# Patient Record
Sex: FEMALE | Race: WHITE | NOT HISPANIC OR LATINO
[De-identification: names, ages, dates, MRNs, and addresses within clinical notes are randomized per-mention and may not be internally consistent; named-entity substitution may affect disease eponyms.]

---

## 2017-02-01 ENCOUNTER — RX RENEWAL (OUTPATIENT)
Age: 42
End: 2017-02-01

## 2017-07-02 ENCOUNTER — RX RENEWAL (OUTPATIENT)
Age: 42
End: 2017-07-02

## 2017-08-27 ENCOUNTER — RX RENEWAL (OUTPATIENT)
Age: 42
End: 2017-08-27

## 2017-09-06 ENCOUNTER — APPOINTMENT (OUTPATIENT)
Dept: OBGYN | Facility: CLINIC | Age: 42
End: 2017-09-06
Payer: COMMERCIAL

## 2017-09-06 VITALS
DIASTOLIC BLOOD PRESSURE: 70 MMHG | WEIGHT: 134 LBS | SYSTOLIC BLOOD PRESSURE: 128 MMHG | HEIGHT: 63 IN | BODY MASS INDEX: 23.74 KG/M2

## 2017-09-06 DIAGNOSIS — Z01.419 ENCOUNTER FOR GYNECOLOGICAL EXAMINATION (GENERAL) (ROUTINE) W/OUT ABNORMAL FINDINGS: ICD-10-CM

## 2017-09-06 PROCEDURE — 99396 PREV VISIT EST AGE 40-64: CPT

## 2017-09-06 RX ORDER — ELUXADOLINE 75 MG/1
75 TABLET, FILM COATED ORAL
Refills: 0 | Status: ACTIVE | COMMUNITY
Start: 2017-09-06

## 2017-09-06 RX ORDER — HYOSCYAMINE SULFATE 0.12 MG/1
0.12 TABLET ORAL
Qty: 60 | Refills: 0 | Status: COMPLETED | COMMUNITY
Start: 2017-07-03

## 2017-09-06 RX ORDER — CLINDAMYCIN HYDROCHLORIDE 300 MG/1
300 CAPSULE ORAL
Qty: 28 | Refills: 0 | Status: COMPLETED | COMMUNITY
Start: 2017-03-27

## 2017-09-07 LAB — HPV HIGH+LOW RISK DNA PNL CVX: NEGATIVE

## 2017-09-10 LAB — CYTOLOGY CVX/VAG DOC THIN PREP: NORMAL

## 2017-09-28 ENCOUNTER — APPOINTMENT (OUTPATIENT)
Dept: OBGYN | Facility: CLINIC | Age: 42
End: 2017-09-28

## 2018-08-24 ENCOUNTER — RX RENEWAL (OUTPATIENT)
Age: 43
End: 2018-08-24

## 2018-10-01 ENCOUNTER — APPOINTMENT (OUTPATIENT)
Dept: OBGYN | Facility: CLINIC | Age: 43
End: 2018-10-01
Payer: COMMERCIAL

## 2018-10-01 VITALS
WEIGHT: 137 LBS | HEIGHT: 63 IN | SYSTOLIC BLOOD PRESSURE: 124 MMHG | DIASTOLIC BLOOD PRESSURE: 87 MMHG | BODY MASS INDEX: 24.27 KG/M2

## 2018-10-01 DIAGNOSIS — Z87.19 PERSONAL HISTORY OF OTHER DISEASES OF THE DIGESTIVE SYSTEM: ICD-10-CM

## 2018-10-01 DIAGNOSIS — Z78.9 OTHER SPECIFIED HEALTH STATUS: ICD-10-CM

## 2018-10-01 PROCEDURE — 99396 PREV VISIT EST AGE 40-64: CPT

## 2018-10-05 LAB — CYTOLOGY CVX/VAG DOC THIN PREP: NORMAL

## 2019-10-14 ENCOUNTER — APPOINTMENT (OUTPATIENT)
Dept: OBGYN | Facility: CLINIC | Age: 44
End: 2019-10-14
Payer: COMMERCIAL

## 2019-10-14 VITALS
DIASTOLIC BLOOD PRESSURE: 85 MMHG | WEIGHT: 137 LBS | BODY MASS INDEX: 24.27 KG/M2 | SYSTOLIC BLOOD PRESSURE: 122 MMHG | HEIGHT: 63 IN

## 2019-10-14 DIAGNOSIS — Z30.41 ENCOUNTER FOR SURVEILLANCE OF CONTRACEPTIVE PILLS: ICD-10-CM

## 2019-10-14 DIAGNOSIS — Z01.419 ENCOUNTER FOR GYNECOLOGICAL EXAMINATION (GENERAL) (ROUTINE) W/OUT ABNORMAL FINDINGS: ICD-10-CM

## 2019-10-14 PROCEDURE — 99396 PREV VISIT EST AGE 40-64: CPT

## 2019-10-14 NOTE — PHYSICAL EXAM
[Awake] : awake [Alert] : alert [Labia Majora] : labia major [Labia Minora] : labia minora [Normal] : clitoris [FreeTextEntry5] : non tender PAP done [FreeTextEntry4] : pink good tone [External Hemorrhoid] : no external hemorrhoids [FreeTextEntry6] : nl bimanual no pelvic massses [FreeTextEntry7] : non tender no uterine or adnexal masses

## 2019-10-21 LAB — CYTOLOGY CVX/VAG DOC THIN PREP: NORMAL

## 2020-02-13 RX ORDER — NORETHINDRONE ACETATE AND ETHINYL ESTRADIOL AND FERROUS FUMARATE 1MG-20(21)
1-20 KIT ORAL
Qty: 3 | Refills: 4 | Status: DISCONTINUED | COMMUNITY
Start: 2018-10-01 | End: 2020-02-13

## 2020-03-23 DIAGNOSIS — N64.89 OTHER SPECIFIED DISORDERS OF BREAST: ICD-10-CM

## 2020-08-07 ENCOUNTER — RX RENEWAL (OUTPATIENT)
Age: 45
End: 2020-08-07

## 2020-10-28 ENCOUNTER — RX RENEWAL (OUTPATIENT)
Age: 45
End: 2020-10-28

## 2020-10-29 ENCOUNTER — RX RENEWAL (OUTPATIENT)
Age: 45
End: 2020-10-29

## 2021-07-28 ENCOUNTER — APPOINTMENT (OUTPATIENT)
Dept: OBGYN | Facility: CLINIC | Age: 46
End: 2021-07-28
Payer: COMMERCIAL

## 2021-07-28 VITALS
WEIGHT: 134 LBS | SYSTOLIC BLOOD PRESSURE: 145 MMHG | HEIGHT: 63 IN | BODY MASS INDEX: 23.74 KG/M2 | DIASTOLIC BLOOD PRESSURE: 83 MMHG

## 2021-07-28 DIAGNOSIS — Z01.419 ENCOUNTER FOR GYNECOLOGICAL EXAMINATION (GENERAL) (ROUTINE) W/OUT ABNORMAL FINDINGS: ICD-10-CM

## 2021-07-28 DIAGNOSIS — N94.10 UNSPECIFIED DYSPAREUNIA: ICD-10-CM

## 2021-07-28 PROCEDURE — 99396 PREV VISIT EST AGE 40-64: CPT

## 2021-07-28 PROCEDURE — 99212 OFFICE O/P EST SF 10 MIN: CPT | Mod: 25

## 2021-07-28 NOTE — DISCUSSION/SUMMARY
[FreeTextEntry1] : 46 yr old on [place]ia and not bleeding on and wants to stay on / Told her at 50 will take her off and may use bioiidentical hormones. She has very painful sex and used every kind of lubricant and still hurts sent Estrace to pharmacy and patient to get TVS as also feels sex pain in abdomen. Sent [place]ia to pharmacy. She is a teacher and off summer.

## 2021-07-28 NOTE — PHYSICAL EXAM
[Appropriately responsive] : appropriately responsive [Alert] : alert [No Acute Distress] : no acute distress [No Lymphadenopathy] : no lymphadenopathy [Regular Rate Rhythm] : regular rate rhythm [No Murmurs] : no murmurs [Clear to Auscultation B/L] : clear to auscultation bilaterally [Soft] : soft [Non-tender] : non-tender [Oriented x3] : oriented x3 [FreeTextEntry3] : ni=o tyromegaly [FreeTextEntry4] : S1 S2 [FreeTextEntry7] : no organomegaly [Examination Of The Breasts] : a normal appearance [No Masses] : no breast masses were palpable [Labia Majora] : normal [Labia Minora] : normal [Normal] : normal [Atrophy] : atrophy [FreeTextEntry5] : no tender PAP done [FreeTextEntry6] : no uterineor adnexal masses but tender

## 2021-07-30 ENCOUNTER — NON-APPOINTMENT (OUTPATIENT)
Age: 46
End: 2021-07-30

## 2021-07-30 DIAGNOSIS — N76.0 ACUTE VAGINITIS: ICD-10-CM

## 2021-07-30 DIAGNOSIS — B96.89 ACUTE VAGINITIS: ICD-10-CM

## 2021-07-30 LAB
CANDIDA VAG CYTO: NOT DETECTED
G VAGINALIS+PREV SP MTYP VAG QL MICRO: DETECTED
T VAGINALIS VAG QL WET PREP: NOT DETECTED

## 2021-08-04 LAB — CYTOLOGY CVX/VAG DOC THIN PREP: NORMAL

## 2022-06-09 ENCOUNTER — RX RENEWAL (OUTPATIENT)
Age: 47
End: 2022-06-09

## 2022-09-01 ENCOUNTER — RX RENEWAL (OUTPATIENT)
Age: 47
End: 2022-09-01

## 2022-09-14 ENCOUNTER — APPOINTMENT (OUTPATIENT)
Dept: ORTHOPEDIC SURGERY | Facility: CLINIC | Age: 47
End: 2022-09-14

## 2022-09-14 VITALS — BODY MASS INDEX: 23.74 KG/M2 | WEIGHT: 134 LBS | HEIGHT: 63 IN

## 2022-09-14 PROCEDURE — 20526 THER INJECTION CARP TUNNEL: CPT | Mod: 50

## 2022-09-14 PROCEDURE — 99214 OFFICE O/P EST MOD 30 MIN: CPT | Mod: 25

## 2022-09-14 NOTE — IMAGING
[de-identified] : right and left hand no swelling or deformity\par No thenar atrophy\par full actve range of motion\par decreased sensation to the median nerve\par intact ulnar and radial sensation\par ain/pin/ulnar motor intact\par +tinels\par palpable pulses with CR<2s\par full motion to the elbow, shoulder\par

## 2022-09-14 NOTE — HISTORY OF PRESENT ILLNESS
[Neck] : neck [Radiating] : radiating [Tingling] : tingling [Constant] : constant [Work] : work [Sleep] : sleep [de-identified] : 9/14/22:  Pt has had n/t in fingers for 2 weeks.  The n/t wakes her from sleep.  She reports that she gets stiffness in her fingers.  She does not have n/t during the day but it is getting progressively worse. [] : no [FreeTextEntry1] : b/l hands/left shoulder/right elbow  [FreeTextEntry3] : 9/4/22 [FreeTextEntry5] : patient is feeling increased pain, numbness and stiffness in the hands. Pain radiates into arm and shoulder. Pain is worse at night  [FreeTextEntry6] : numbness  [FreeTextEntry7] : right elbow to the shoulder

## 2022-09-14 NOTE — ASSESSMENT
[FreeTextEntry1] : The patient was advised of the diagnosis.  The natural history of the pathology was explained in full to the patient in layman's terms. We discussed the nature of the nerve as an electrical cable and what happens to the nerve in carpal tunnel syndrome.  We discussed that treatment for night symptoms included night bracing.  We discussed the possibility of injection when symptoms were intermittent or in patients who were unwilling to undergo surgery with constant symptoms.  We discussed that injection is a diagnostic and therapeutic aide and what this means.  We discussed the use of nerve testing in cases when diagnosis was in doubt or for confirmation to exclude alternate pathology.  We discussed that if symptoms were 24/7 surgery was recommended to give the nerve the best chance to recover but that once symptoms were constant, the nerve may not recover even with surgery.  We discussed that if left alone the nerve progression could worsen and that treatment was indicated to prevent progression of nerve compression.  The longer the nerve is left, the more likely to cause worsening irreversible damage.  All questions were answered.  The risks and benefits of surgical and non-surgical treatment alternatives were explained in full to the patient.\par The risks, benefits and contents of the injection have been discussed.  Risks include but are not limited to allergic reaction, flare reaction, permanent white skin discoloration at the injection site and infection.  The patient understands the risks and agrees to having the injection.  We also discussed that about 22% of patients have relief at a year but the rest have recurrence if the symptoms.  However, if the injection is successful it is a positive predictor of benefit of surgery.  Success of the injection to relieve symptoms is also a great diagnostic test and obviates the need for EMG testing.  The patient verbalized understanding.  All questions have been answered. A sterile prep of the left and right volar wrist was performed and the carpal tunnel was entered and injected with 1 cc of Lidocaine and 6mg of celestone. The patient tolerated the procedure well without complication. The patient was instructed to ice the area of the injection\par \par Recommended that the pt wear night braces.\par

## 2022-10-25 ENCOUNTER — APPOINTMENT (OUTPATIENT)
Dept: ORTHOPEDIC SURGERY | Facility: CLINIC | Age: 47
End: 2022-10-25

## 2022-11-24 ENCOUNTER — RX RENEWAL (OUTPATIENT)
Age: 47
End: 2022-11-24

## 2023-01-13 ENCOUNTER — APPOINTMENT (OUTPATIENT)
Dept: ORTHOPEDIC SURGERY | Facility: CLINIC | Age: 48
End: 2023-01-13
Payer: COMMERCIAL

## 2023-01-13 VITALS — BODY MASS INDEX: 23.74 KG/M2 | WEIGHT: 134 LBS | HEIGHT: 63 IN

## 2023-01-13 DIAGNOSIS — G56.21 LESION OF ULNAR NERVE, RIGHT UPPER LIMB: ICD-10-CM

## 2023-01-13 DIAGNOSIS — G56.22 LESION OF ULNAR NERVE, LEFT UPPER LIMB: ICD-10-CM

## 2023-01-13 PROCEDURE — 20526 THER INJECTION CARP TUNNEL: CPT | Mod: 50

## 2023-01-13 PROCEDURE — 99214 OFFICE O/P EST MOD 30 MIN: CPT | Mod: 25

## 2023-01-13 NOTE — IMAGING
[de-identified] : right and left hand no swelling or deformity\par No thenar atrophy noted to the right / left hand\par full active range of motion symmetrically\par decreased sensation to the median nerve and ulnar nerve symmetrically\par intact radial sensation symmetrically\par ain/pin/ulnar motor intact\par +tinels over the bilateral carpal tunnels and cubital tunnels\par palpable pulses with CR<2s\par full motion to the elbow, shoulder\par Spurling Signs are negative\par

## 2023-01-13 NOTE — ASSESSMENT
[FreeTextEntry1] : The patient was advised of the diagnosis.  The natural history of the pathology was explained in full to the patient in layman's terms. We discussed the nature of the nerve as an electrical cable and what happens to the nerve in carpal tunnel syndrome.  We discussed that treatment for night symptoms included night bracing.  We discussed the possibility of injection when symptoms were intermittent or in patients who were unwilling to undergo surgery with constant symptoms.  We discussed that injection is a diagnostic and therapeutic aide and what this means.  We discussed the use of nerve testing in cases when diagnosis was in doubt or for confirmation to exclude alternate pathology.  We discussed that if symptoms were 24/7 surgery was recommended to give the nerve the best chance to recover but that once symptoms were constant, the nerve may not recover even with surgery.  We discussed that if left alone the nerve progression could worsen and that treatment was indicated to prevent progression of nerve compression.  The longer the nerve is left, the more likely to cause worsening irreversible damage.  All questions were answered.  The risks and benefits of surgical and non-surgical treatment alternatives were explained in full to the patient.\par The risks, benefits and contents of the injection have been discussed.  Risks include but are not limited to allergic reaction, flare reaction, permanent white skin discoloration at the injection site and infection.  The patient understands the risks and agrees to having the injection.  We also discussed that about 22% of patients have relief at a year but the rest have recurrence if the symptoms.  However, if the injection is successful it is a positive predictor of benefit of surgery.  Success of the injection to relieve symptoms is also a great diagnostic test and obviates the need for EMG testing.  The patient verbalized understanding.  All questions have been answered. A sterile prep of the left and right volar wrist was performed and the carpal tunnel was entered and injected with 1 cc of Lidocaine and 6mg of celestone. The patient tolerated the procedure well without complication. The patient was instructed to ice the area of the injection\par Bilateral carpal tunnel CSI #2 performed today. \par \par Recommended that the pt wear night braces for Cubital Tunnel Syndrome\par Pt referred for EMG testing and she will f/u with Dr. Menezes next week. \par

## 2023-01-13 NOTE — HISTORY OF PRESENT ILLNESS
[8] : 8 [Tingling] : tingling [Full time] : Work status: full time [Steroid] : Steroid [Neck] : neck [Radiating] : radiating [Constant] : constant [Work] : work [Sleep] : sleep [de-identified] : \par 1/13/2023: pt here with recurrent bilateral severe carpal tunnel syndrome. Pt states this flaired up on her again 2 weeks ago ( pt has had one previous CSI to the carpal tunnels in September with significant pain relief. \par Pt denies any associated weakness or cervical symptoms. \par Pt states her symptoms have been 24/7 over the past week and worse at night, not allowing her to sleep.\par Night splinting wrists has not helped "at all".\par \par Dr. Masters note below:\par 9/14/22:  Pt has had n/t in fingers for 2 weeks.  The n/t wakes her from sleep.  She reports that she gets stiffness in her fingers.  She does not have n/t during the day but it is getting progressively worse. [] : Post Surgical Visit: no [FreeTextEntry1] : b/l hands/left shoulder/right elbow  [FreeTextEntry3] : 9/4/22 [FreeTextEntry5] : patient is feeling increased pain, numbness and stiffness in the hands. Pain radiates into arm and shoulder. Pain is worse at night  [FreeTextEntry6] : numbness  [FreeTextEntry7] : right elbow to the shoulder

## 2023-01-19 ENCOUNTER — APPOINTMENT (OUTPATIENT)
Dept: NEUROLOGY | Facility: CLINIC | Age: 48
End: 2023-01-19
Payer: COMMERCIAL

## 2023-01-19 DIAGNOSIS — M79.2 NEURALGIA AND NEURITIS, UNSPECIFIED: ICD-10-CM

## 2023-01-19 PROCEDURE — 95886 MUSC TEST DONE W/N TEST COMP: CPT

## 2023-01-19 PROCEDURE — 95912 NRV CNDJ TEST 11-12 STUDIES: CPT

## 2023-01-23 ENCOUNTER — APPOINTMENT (OUTPATIENT)
Dept: ORTHOPEDIC SURGERY | Facility: CLINIC | Age: 48
End: 2023-01-23
Payer: COMMERCIAL

## 2023-01-23 VITALS — BODY MASS INDEX: 23.74 KG/M2 | WEIGHT: 134 LBS | HEIGHT: 63 IN

## 2023-01-23 PROCEDURE — 99213 OFFICE O/P EST LOW 20 MIN: CPT

## 2023-01-24 NOTE — HISTORY OF PRESENT ILLNESS
[Gradual] : gradual [10] : 10 [8] : 8 [Dull/Aching] : dull/aching [Sharp] : sharp [Tingling] : tingling [Sleep] : sleep [Nothing helps with pain getting better] : Nothing helps with pain getting better [de-identified] : She has bilateral hand numbness nad tingling that gets severe\par Injections for CTS help\par \par EMG shows mild bilateral CTS [] : no [FreeTextEntry1] : wrists [FreeTextEntry5] : patient states she has pain, swelling and numbness [FreeTextEntry6] : numbness [FreeTextEntry7] : up and down the arm  [de-identified] : activity  [de-identified] : 01/13/23 [de-identified] : [facility]  [de-identified] : EMG

## 2023-01-24 NOTE — ASSESSMENT
[FreeTextEntry1] : She just received injection\par I discussed possible another injection vs CTR with her\par WIll return prn

## 2023-01-24 NOTE — PHYSICAL EXAM
[de-identified] : R hand: \par Tender volar wrist \par Good finger ROM \par +Tinels \par +Phalens \par +Compression test \par Decreased sensation median nerve distribution\par \par \par L hand: \par Tender volar wrist \par Good finger ROM \par +Tinels \par +Phalens \par +Compression test \par Decreased sensation median nerve distribution\par

## 2023-05-04 ENCOUNTER — APPOINTMENT (OUTPATIENT)
Dept: ORTHOPEDIC SURGERY | Facility: CLINIC | Age: 48
End: 2023-05-04
Payer: COMMERCIAL

## 2023-05-04 VITALS — WEIGHT: 134 LBS | HEIGHT: 63 IN | BODY MASS INDEX: 23.74 KG/M2

## 2023-05-04 DIAGNOSIS — G56.02 CARPAL TUNNEL SYNDROME, LEFT UPPER LIMB: ICD-10-CM

## 2023-05-04 PROCEDURE — 20526 THER INJECTION CARP TUNNEL: CPT | Mod: 50

## 2023-05-04 PROCEDURE — J3490M: CUSTOM

## 2023-05-04 PROCEDURE — 99214 OFFICE O/P EST MOD 30 MIN: CPT | Mod: 25

## 2023-05-04 NOTE — PHYSICAL EXAM
[de-identified] : R hand: \par Tender volar wrist \par Good finger ROM \par +Tinels \par +Phalens \par +Compression test \par Decreased sensation median nerve distribution\par +thenar atrophy\par \par L hand: \par Tender volar wrist \par Good finger ROM \par +Tinels \par +Phalens \par +Compression test \par Decreased sensation median nerve distribution\par +thenar atrophy\par \par

## 2023-05-04 NOTE — ASSESSMENT
[FreeTextEntry1] : For R CTR (after 6/4)\par R/B/A of surgery discussed with the patient. Risks including but not limited to infection, nerve damage, tendon damage, pain, stiffness, recurrence, no resolution of symptoms, loss of function, limb or life. They understand and agree to surgery \par Return post op

## 2023-05-04 NOTE — HISTORY OF PRESENT ILLNESS
[Tingling] : tingling [10] : 10 [7] : 7 [Dull/Aching] : dull/aching [de-identified] : Bilateral CTS\par  [FreeTextEntry1] : writs  [FreeTextEntry5] : pain is worse\par  [FreeTextEntry6] : numbness [de-identified] : inj last visit

## 2023-05-04 NOTE — DISCUSSION/SUMMARY
[de-identified] : Bilateral Carpal tunnel injection was performed because of pain inflammation\par Anesthesia: ethyl chloride sprayed topically\par Celestone: An injection of Celestone 1cc\par Lidocaine: An injection of Lidocaine 1% 1cc\par Marcaine: An injection of Marcaine 0.5% 1cc\par x2\par \par Patient has tried OTC's including aspirin, Ibuprofen, Aleve etc or prescription NSAIDS, and/or exercises at home and/ or\par physical therapy without satisfactory response.\par After verbal consent using sterile preparation and technique. The risks, benefits, and alternatives to cortisone injection\par were explained in full to the patient. Risks outlined include but are not limited to infection, sepsis, bleeding, scarring, skin\par discoloration, temporary increase in pain, syncopal episode, failure to resolve symptoms, allergic reaction, symptom\par recurrence, and elevation of blood sugar in diabetics. Patient understood the risks. All questions were answered. After\par discussion of options, patient requested an injection. Oral informed consent was obtained and sterile prep was done of the\par injection site. Sterile technique was utilized for the procedure including the preparation of the solutions used for the\par injection. Patient tolerated the procedure well. Advised to ice the injection site this evening.\par Prep with betadine locally to site. Sterile technique used

## 2023-07-26 ENCOUNTER — APPOINTMENT (OUTPATIENT)
Age: 48
End: 2023-07-26
Payer: COMMERCIAL

## 2023-07-26 PROCEDURE — 64721 CARPAL TUNNEL SURGERY: CPT | Mod: AS,RT

## 2023-07-26 PROCEDURE — 64721 CARPAL TUNNEL SURGERY: CPT | Mod: RT

## 2023-08-03 ENCOUNTER — APPOINTMENT (OUTPATIENT)
Dept: ORTHOPEDIC SURGERY | Facility: CLINIC | Age: 48
End: 2023-08-03
Payer: COMMERCIAL

## 2023-08-03 VITALS — BODY MASS INDEX: 23.74 KG/M2 | WEIGHT: 134 LBS | HEIGHT: 63 IN

## 2023-08-03 PROCEDURE — 99024 POSTOP FOLLOW-UP VISIT: CPT

## 2023-08-03 NOTE — PHYSICAL EXAM
[de-identified] : Mild hand swelling Healed incision No evidence of infection Mild tenderness at the surgical site Good finger ROM Improved sensation

## 2023-08-03 NOTE — HISTORY OF PRESENT ILLNESS
[0] : 0 [Tingling] : tingling [] : Post Surgical Visit: yes [de-identified] : R CTR last wek SHe is improved  [FreeTextEntry1] : R hand [FreeTextEntry5] : here for post op  [FreeTextEntry6] : numbness [de-identified] : 7/26/23 [de-identified] : R ALEXANDRE

## 2023-08-03 NOTE — ASSESSMENT
[FreeTextEntry1] : Sutures removed Steris applied LIght activities and advance as tolerated Retirn in 2-3 weeks Hold surgery next week

## 2023-08-09 ENCOUNTER — APPOINTMENT (OUTPATIENT)
Dept: OBGYN | Facility: CLINIC | Age: 48
End: 2023-08-09
Payer: COMMERCIAL

## 2023-08-09 ENCOUNTER — APPOINTMENT (OUTPATIENT)
Age: 48
End: 2023-08-09

## 2023-08-09 VITALS
BODY MASS INDEX: 23.04 KG/M2 | SYSTOLIC BLOOD PRESSURE: 135 MMHG | WEIGHT: 130 LBS | HEIGHT: 63 IN | DIASTOLIC BLOOD PRESSURE: 78 MMHG

## 2023-08-09 PROCEDURE — 99396 PREV VISIT EST AGE 40-64: CPT

## 2023-08-09 PROCEDURE — 87210 SMEAR WET MOUNT SALINE/INK: CPT | Mod: QW

## 2023-08-09 NOTE — HISTORY OF PRESENT ILLNESS
[TextBox_4] : Annual exam for this 48 year old female, , LMP 6 years ago c/o vaginal dryness and painful intercourse x several years.

## 2023-08-10 LAB — HPV HIGH+LOW RISK DNA PNL CVX: NOT DETECTED

## 2023-08-14 LAB — CYTOLOGY CVX/VAG DOC THIN PREP: NORMAL

## 2023-08-15 ENCOUNTER — APPOINTMENT (OUTPATIENT)
Dept: OBGYN | Facility: CLINIC | Age: 48
End: 2023-08-15

## 2023-08-17 ENCOUNTER — APPOINTMENT (OUTPATIENT)
Dept: ORTHOPEDIC SURGERY | Facility: CLINIC | Age: 48
End: 2023-08-17
Payer: COMMERCIAL

## 2023-08-17 VITALS — BODY MASS INDEX: 23.04 KG/M2 | WEIGHT: 130 LBS | HEIGHT: 63 IN

## 2023-08-17 DIAGNOSIS — G56.01 CARPAL TUNNEL SYNDROME, RIGHT UPPER LIMB: ICD-10-CM

## 2023-08-17 PROCEDURE — 99024 POSTOP FOLLOW-UP VISIT: CPT

## 2023-08-17 NOTE — HISTORY OF PRESENT ILLNESS
[Tingling] : tingling [] : Post Surgical Visit: yes [7] : 7 [Dull/Aching] : dull/aching [de-identified] : R CTR She still has pain at the surgical site [FreeTextEntry1] : R hand [FreeTextEntry6] : numbness [de-identified] : no [de-identified] : 07/26/23 [de-identified] : R ALEXANDRE

## 2023-08-17 NOTE — REVIEW OF SYSTEMS
[Joint Stiffness] : joint stiffness [Negative] : Heme/Lymph [Joint Pain] : joint pain [Joint Swelling] : joint swelling

## 2024-06-06 ENCOUNTER — APPOINTMENT (OUTPATIENT)
Dept: INTERNAL MEDICINE | Facility: CLINIC | Age: 49
End: 2024-06-06
Payer: COMMERCIAL

## 2024-06-06 ENCOUNTER — NON-APPOINTMENT (OUTPATIENT)
Age: 49
End: 2024-06-06

## 2024-06-06 VITALS
DIASTOLIC BLOOD PRESSURE: 75 MMHG | RESPIRATION RATE: 16 BRPM | BODY MASS INDEX: 24.27 KG/M2 | WEIGHT: 137 LBS | HEIGHT: 63 IN | SYSTOLIC BLOOD PRESSURE: 126 MMHG | HEART RATE: 72 BPM | OXYGEN SATURATION: 97 %

## 2024-06-06 DIAGNOSIS — K58.9 IRRITABLE BOWEL SYNDROME W/OUT DIARRHEA: ICD-10-CM

## 2024-06-06 DIAGNOSIS — Z13.31 ENCOUNTER FOR SCREENING FOR DEPRESSION: ICD-10-CM

## 2024-06-06 DIAGNOSIS — F41.1 GENERALIZED ANXIETY DISORDER: ICD-10-CM

## 2024-06-06 DIAGNOSIS — Z00.00 ENCOUNTER FOR GENERAL ADULT MEDICAL EXAMINATION W/OUT ABNORMAL FINDINGS: ICD-10-CM

## 2024-06-06 DIAGNOSIS — Z87.42 PERSONAL HISTORY OF OTHER DISEASES OF THE FEMALE GENITAL TRACT: ICD-10-CM

## 2024-06-06 DIAGNOSIS — F90.9 ATTENTION-DEFICIT HYPERACTIVITY DISORDER, UNSPECIFIED TYPE: ICD-10-CM

## 2024-06-06 PROCEDURE — G0444 DEPRESSION SCREEN ANNUAL: CPT | Mod: 59

## 2024-06-06 PROCEDURE — 99203 OFFICE O/P NEW LOW 30 MIN: CPT

## 2024-06-06 PROCEDURE — 93000 ELECTROCARDIOGRAM COMPLETE: CPT

## 2024-06-06 PROCEDURE — 99386 PREV VISIT NEW AGE 40-64: CPT | Mod: 25

## 2024-06-06 RX ORDER — ALPRAZOLAM 1 MG/1
1 TABLET ORAL
Qty: 20 | Refills: 0 | Status: ACTIVE | COMMUNITY
Start: 1900-01-01 | End: 1900-01-01

## 2024-06-06 RX ORDER — DROSPIRENONE AND ETHINYL ESTRADIOL 0.02-3(28)
3-0.02 KIT ORAL DAILY
Qty: 84 | Refills: 3 | Status: DISCONTINUED | COMMUNITY
Start: 2021-07-28 | End: 2024-06-06

## 2024-06-06 RX ORDER — NORETHINDRONE ACETATE AND ETHINYL ESTRADIOL 1MG-20(21)
1-20 KIT ORAL
Qty: 1 | Refills: 0 | Status: DISCONTINUED | COMMUNITY
Start: 2017-02-01 | End: 2024-06-06

## 2024-06-06 RX ORDER — LISDEXAMFETAMINE 70 MG/1
70 CAPSULE ORAL DAILY
Qty: 30 | Refills: 0 | Status: ACTIVE | COMMUNITY
Start: 1900-01-01 | End: 1900-01-01

## 2024-06-06 RX ORDER — METRONIDAZOLE 7.5 MG/G
0.75 GEL VAGINAL
Qty: 1 | Refills: 3 | Status: DISCONTINUED | COMMUNITY
Start: 2021-07-30 | End: 2024-06-06

## 2024-06-06 RX ORDER — ESTRADIOL 0.1 MG/G
0.1 CREAM VAGINAL
Qty: 1 | Refills: 6 | Status: DISCONTINUED | COMMUNITY
Start: 2021-07-28 | End: 2024-06-06

## 2024-06-06 RX ORDER — NORETHINDRONE ACETATE AND ETHINYL ESTRADIOL 1MG-20(21)
1-20 KIT ORAL DAILY
Qty: 90 | Refills: 1 | Status: DISCONTINUED | COMMUNITY
Start: 2019-10-14 | End: 2024-06-06

## 2024-06-06 RX ORDER — HYDROCODONE BITARTRATE AND ACETAMINOPHEN 5; 325 MG/1; MG/1
5-325 TABLET ORAL
Qty: 10 | Refills: 0 | Status: DISCONTINUED | COMMUNITY
Start: 2023-07-24 | End: 2024-06-06

## 2024-06-06 RX ORDER — NORETHINDRONE ACETATE AND ETHINYL ESTRADIOL AND FERROUS FUMARATE 1MG-20(21)
1-20 KIT ORAL
Qty: 1 | Refills: 0 | Status: DISCONTINUED | COMMUNITY
Start: 2020-02-13 | End: 2024-06-06

## 2024-06-06 NOTE — HEALTH RISK ASSESSMENT
[Never] : Never [No] : In the past 12 months have you used drugs other than those required for medical reasons? No [0] : 2) Feeling down, depressed, or hopeless: Not at all (0) [PHQ-2 Negative - No further assessment needed] : PHQ-2 Negative - No further assessment needed [Time Spent: ___ Minutes] : I spent [unfilled] minutes performing a depression screening for this patient. [Patient reported colonoscopy was normal] : Patient reported colonoscopy was normal [Feels Safe at Home] : Feels safe at home [[id number]] : 0 [Reports changes in hearing] : Reports no changes in hearing [Reports changes in vision] : Reports no changes in vision [Reports changes in dental health] : Reports no changes in dental health [MammogramDate] : 08/20 [ColonoscopyDate] : 01/22

## 2024-06-06 NOTE — PLAN
[FreeTextEntry1] : Reviewed age appropriate preventive screening with patient today and importance of regular screening as indicated. Encouraged exercise of at least 30 mins daily of moderate activity as tolerated.  If unable to participate in moderate activity, encouraged walking daily for 20 to 30mins. Discussed healthy dietary intake of vegetables, whole grains, lean proteins with avoidance of high sugar and sodium intake. Completed labs in office today, will await results and notify patient accordingly  -Aware need to f/u regularly in office for refills of controlled substances -Advised to use xanax as PRN basis, if requires standing or on daily basis will need to see psych -F/U in 3 mos for medications

## 2024-06-06 NOTE — HISTORY OF PRESENT ILLNESS
[FreeTextEntry1] : NP CPE Medication refill [de-identified] : Ms. YOLI BETANCOURT is a 49 year old female hx of IBS, ADHD, anxiety here for CPE Here PCP now doing  medicine, here to establish care for prescription management Previously on SSRIs - states worsened anxiety-now on xanax 1mg - usually takes 1/2 tablet and not requiring on daily basis Hx of ADHD, started on vyvanse by her PCP

## 2024-06-06 NOTE — CURRENT MEDS
[Takes medication as prescribed] : takes [None] : Patient does not have any barriers to medication adherence [Benzodiazepines] : benzodiazepines [Other____] : [unfilled] [FreeTextEntry1] : Aware of risks of medications, including dependence, tolerance

## 2024-06-11 LAB
ALBUMIN SERPL ELPH-MCNC: 4.3 G/DL
ALP BLD-CCNC: 95 U/L
ALT SERPL-CCNC: 18 U/L
ANION GAP SERPL CALC-SCNC: 12 MMOL/L
AST SERPL-CCNC: 20 U/L
BASOPHILS # BLD AUTO: 0.02 K/UL
BASOPHILS NFR BLD AUTO: 0.4 %
BILIRUB SERPL-MCNC: 0.6 MG/DL
BUN SERPL-MCNC: 12 MG/DL
CALCIUM SERPL-MCNC: 9.1 MG/DL
CHLORIDE SERPL-SCNC: 109 MMOL/L
CHOLEST SERPL-MCNC: 168 MG/DL
CO2 SERPL-SCNC: 24 MMOL/L
CREAT SERPL-MCNC: 0.69 MG/DL
EGFR: 106 ML/MIN/1.73M2
EOSINOPHIL # BLD AUTO: 0.11 K/UL
EOSINOPHIL NFR BLD AUTO: 2.3 %
ESTIMATED AVERAGE GLUCOSE: 105 MG/DL
GLUCOSE SERPL-MCNC: 96 MG/DL
HBA1C MFR BLD HPLC: 5.3 %
HCT VFR BLD CALC: 39.1 %
HDLC SERPL-MCNC: 84 MG/DL
HGB BLD-MCNC: 12.3 G/DL
IMM GRANULOCYTES NFR BLD AUTO: 0 %
LDLC SERPL CALC-MCNC: 75 MG/DL
LYMPHOCYTES # BLD AUTO: 1.37 K/UL
LYMPHOCYTES NFR BLD AUTO: 29 %
MAN DIFF?: NORMAL
MCHC RBC-ENTMCNC: 29.3 PG
MCHC RBC-ENTMCNC: 31.5 GM/DL
MCV RBC AUTO: 93.1 FL
MONOCYTES # BLD AUTO: 0.33 K/UL
MONOCYTES NFR BLD AUTO: 7 %
NEUTROPHILS # BLD AUTO: 2.89 K/UL
NEUTROPHILS NFR BLD AUTO: 61.3 %
NONHDLC SERPL-MCNC: 85 MG/DL
PLATELET # BLD AUTO: 185 K/UL
POTASSIUM SERPL-SCNC: 4.1 MMOL/L
PROT SERPL-MCNC: 6.9 G/DL
RBC # BLD: 4.2 M/UL
RBC # FLD: 13.1 %
SODIUM SERPL-SCNC: 145 MMOL/L
TRIGL SERPL-MCNC: 48 MG/DL
WBC # FLD AUTO: 4.72 K/UL

## 2024-09-09 ENCOUNTER — APPOINTMENT (OUTPATIENT)
Dept: INTERNAL MEDICINE | Facility: CLINIC | Age: 49
End: 2024-09-09

## 2024-09-09 VITALS
OXYGEN SATURATION: 100 % | TEMPERATURE: 98.1 F | BODY MASS INDEX: 24.98 KG/M2 | RESPIRATION RATE: 16 BRPM | HEIGHT: 63 IN | SYSTOLIC BLOOD PRESSURE: 130 MMHG | WEIGHT: 141 LBS | HEART RATE: 78 BPM | DIASTOLIC BLOOD PRESSURE: 80 MMHG

## 2024-09-09 DIAGNOSIS — F41.1 GENERALIZED ANXIETY DISORDER: ICD-10-CM

## 2024-09-09 DIAGNOSIS — Z23 ENCOUNTER FOR IMMUNIZATION: ICD-10-CM

## 2024-09-09 DIAGNOSIS — M79.18 MYALGIA, OTHER SITE: ICD-10-CM

## 2024-09-09 DIAGNOSIS — B00.1 HERPESVIRAL VESICULAR DERMATITIS: ICD-10-CM

## 2024-09-09 DIAGNOSIS — F90.9 ATTENTION-DEFICIT HYPERACTIVITY DISORDER, UNSPECIFIED TYPE: ICD-10-CM

## 2024-09-09 PROCEDURE — G0008: CPT

## 2024-09-09 PROCEDURE — 36415 COLL VENOUS BLD VENIPUNCTURE: CPT

## 2024-09-09 PROCEDURE — G2211 COMPLEX E/M VISIT ADD ON: CPT | Mod: NC

## 2024-09-09 PROCEDURE — 90656 IIV3 VACC NO PRSV 0.5 ML IM: CPT

## 2024-09-09 PROCEDURE — 99214 OFFICE O/P EST MOD 30 MIN: CPT | Mod: 25

## 2024-09-09 RX ORDER — ACYCLOVIR 400 MG/1
400 TABLET ORAL 3 TIMES DAILY
Qty: 15 | Refills: 4 | Status: ACTIVE | COMMUNITY
Start: 2024-09-09 | End: 1900-01-01

## 2024-09-09 RX ORDER — ACYCLOVIR 50 MG/G
5 OINTMENT TOPICAL
Qty: 1 | Refills: 2 | Status: ACTIVE | COMMUNITY
Start: 2024-09-09 | End: 1900-01-01

## 2024-09-09 NOTE — PLAN
[FreeTextEntry1] : -Rx for acyclovir sent -Renew xanax, not due for vyvanse as of yet Completed labs in office today, will await results and notify patient accordingly for mylagias/stiffness -Flu vaccine today -F/U in 3 mos

## 2024-09-09 NOTE — PHYSICAL EXAM
[Normal] : normal rate, regular rhythm, normal S1 and S2 and no murmur heard [No Joint Swelling] : no joint swelling [Grossly Normal Strength/Tone] : grossly normal strength/tone [Coordination Grossly Intact] : coordination grossly intact [No Focal Deficits] : no focal deficits [Normal Gait] : normal gait

## 2024-09-09 NOTE — HISTORY OF PRESENT ILLNESS
[FreeTextEntry1] : Chronic medical conditions  [de-identified] : Ms. YOLI BETANCOURT is a 49 year old female hx of IBS, ADHD, anxiety here for medication management -Previously on SSRIs - states worsened anxiety-now on xanax 1mg - usually takes 1/2 tablet and not requiring on daily basis -Hx of ADHD, started on vyvanse by her PCP -When increased anxiety, has cold sores-usually takes acyclovir and uses ointment at times -Chronic muscle stiffness, intermittent swelling of hands Stiffness worse w/ prolonged sitting, feels muscle aches

## 2024-09-12 DIAGNOSIS — R76.8 OTHER SPECIFIED ABNORMAL IMMUNOLOGICAL FINDINGS IN SERUM: ICD-10-CM

## 2024-09-12 LAB
ALBUMIN SERPL ELPH-MCNC: 4.7 G/DL
ALP BLD-CCNC: 90 U/L
ALT SERPL-CCNC: 13 U/L
ANA PAT FLD IF-IMP: NORMAL
ANA SER IF-ACNC: ABNORMAL
ANION GAP SERPL CALC-SCNC: 12 MMOL/L
AST SERPL-CCNC: 18 U/L
BILIRUB SERPL-MCNC: 0.9 MG/DL
BUN SERPL-MCNC: 15 MG/DL
CALCIUM SERPL-MCNC: 9.6 MG/DL
CHLORIDE SERPL-SCNC: 104 MMOL/L
CK SERPL-CCNC: 95 U/L
CO2 SERPL-SCNC: 24 MMOL/L
CREAT SERPL-MCNC: 0.74 MG/DL
EGFR: 99 ML/MIN/1.73M2
GLUCOSE SERPL-MCNC: 89 MG/DL
POTASSIUM SERPL-SCNC: 4 MMOL/L
PROT SERPL-MCNC: 7.2 G/DL
RHEUMATOID FACT SER QL: 10 IU/ML
SODIUM SERPL-SCNC: 140 MMOL/L
TSH SERPL-ACNC: 0.7 UIU/ML

## 2024-12-16 ENCOUNTER — APPOINTMENT (OUTPATIENT)
Dept: INTERNAL MEDICINE | Facility: CLINIC | Age: 49
End: 2024-12-16
Payer: COMMERCIAL

## 2024-12-16 VITALS
BODY MASS INDEX: 24.46 KG/M2 | OXYGEN SATURATION: 99 % | HEART RATE: 86 BPM | TEMPERATURE: 99.8 F | WEIGHT: 138.06 LBS | HEIGHT: 63 IN

## 2024-12-16 DIAGNOSIS — R05.9 COUGH, UNSPECIFIED: ICD-10-CM

## 2024-12-16 PROCEDURE — G2211 COMPLEX E/M VISIT ADD ON: CPT | Mod: NC

## 2024-12-16 PROCEDURE — 99203 OFFICE O/P NEW LOW 30 MIN: CPT

## 2024-12-16 RX ORDER — BENZONATATE 100 MG/1
100 CAPSULE ORAL 3 TIMES DAILY
Qty: 21 | Refills: 0 | Status: ACTIVE | COMMUNITY
Start: 2024-12-16 | End: 1900-01-01

## 2024-12-16 RX ORDER — DOXYCYCLINE HYCLATE 100 MG/1
100 TABLET ORAL
Qty: 14 | Refills: 0 | Status: ACTIVE | COMMUNITY
Start: 2024-12-16 | End: 1900-01-01

## 2025-03-03 ENCOUNTER — APPOINTMENT (OUTPATIENT)
Dept: INTERNAL MEDICINE | Facility: CLINIC | Age: 50
End: 2025-03-03
Payer: COMMERCIAL

## 2025-03-03 VITALS
RESPIRATION RATE: 16 BRPM | BODY MASS INDEX: 24.63 KG/M2 | TEMPERATURE: 98.3 F | WEIGHT: 139 LBS | HEIGHT: 63 IN | HEART RATE: 82 BPM | OXYGEN SATURATION: 97 %

## 2025-03-03 VITALS — DIASTOLIC BLOOD PRESSURE: 72 MMHG | SYSTOLIC BLOOD PRESSURE: 124 MMHG

## 2025-03-03 DIAGNOSIS — F41.9 ANXIETY DISORDER, UNSPECIFIED: ICD-10-CM

## 2025-03-03 DIAGNOSIS — F90.9 ATTENTION-DEFICIT HYPERACTIVITY DISORDER, UNSPECIFIED TYPE: ICD-10-CM

## 2025-03-03 DIAGNOSIS — Z02.89 ENCOUNTER FOR OTHER ADMINISTRATIVE EXAMINATIONS: ICD-10-CM

## 2025-03-03 DIAGNOSIS — K58.9 IRRITABLE BOWEL SYNDROME, UNSPECIFIED: ICD-10-CM

## 2025-03-03 PROCEDURE — G2211 COMPLEX E/M VISIT ADD ON: CPT | Mod: NC

## 2025-03-03 PROCEDURE — 99213 OFFICE O/P EST LOW 20 MIN: CPT

## 2025-03-07 LAB
M TB IFN-G BLD-IMP: NEGATIVE
QUANTIFERON TB PLUS MITOGEN MINUS NIL: >10 IU/ML
QUANTIFERON TB PLUS NIL: 0.02 IU/ML
QUANTIFERON TB PLUS TB1 MINUS NIL: 0 IU/ML
QUANTIFERON TB PLUS TB2 MINUS NIL: 0 IU/ML

## 2025-05-29 ENCOUNTER — APPOINTMENT (OUTPATIENT)
Dept: INTERNAL MEDICINE | Facility: CLINIC | Age: 50
End: 2025-05-29
Payer: COMMERCIAL

## 2025-05-29 VITALS — SYSTOLIC BLOOD PRESSURE: 128 MMHG | DIASTOLIC BLOOD PRESSURE: 72 MMHG

## 2025-05-29 VITALS
OXYGEN SATURATION: 100 % | BODY MASS INDEX: 26.4 KG/M2 | WEIGHT: 149 LBS | RESPIRATION RATE: 15 BRPM | TEMPERATURE: 97.8 F | HEART RATE: 76 BPM | HEIGHT: 63 IN

## 2025-05-29 DIAGNOSIS — F41.9 ANXIETY DISORDER, UNSPECIFIED: ICD-10-CM

## 2025-05-29 DIAGNOSIS — B00.1 HERPESVIRAL VESICULAR DERMATITIS: ICD-10-CM

## 2025-05-29 DIAGNOSIS — F90.9 ATTENTION-DEFICIT HYPERACTIVITY DISORDER, UNSPECIFIED TYPE: ICD-10-CM

## 2025-05-29 DIAGNOSIS — K58.9 IRRITABLE BOWEL SYNDROME, UNSPECIFIED: ICD-10-CM

## 2025-05-29 PROCEDURE — G2211 COMPLEX E/M VISIT ADD ON: CPT | Mod: NC

## 2025-05-29 PROCEDURE — 99213 OFFICE O/P EST LOW 20 MIN: CPT

## 2025-05-29 RX ORDER — VALACYCLOVIR 1 G/1
1 TABLET, FILM COATED ORAL
Qty: 4 | Refills: 2 | Status: ACTIVE | COMMUNITY
Start: 2025-05-29 | End: 1900-01-01

## 2025-08-28 ENCOUNTER — APPOINTMENT (OUTPATIENT)
Dept: INTERNAL MEDICINE | Facility: CLINIC | Age: 50
End: 2025-08-28
Payer: COMMERCIAL

## 2025-08-28 VITALS
SYSTOLIC BLOOD PRESSURE: 152 MMHG | DIASTOLIC BLOOD PRESSURE: 80 MMHG | WEIGHT: 154 LBS | HEIGHT: 63 IN | BODY MASS INDEX: 27.29 KG/M2

## 2025-08-28 VITALS — RESPIRATION RATE: 18 BRPM | HEART RATE: 77 BPM | OXYGEN SATURATION: 100 % | TEMPERATURE: 98.2 F

## 2025-08-28 DIAGNOSIS — F90.9 ATTENTION-DEFICIT HYPERACTIVITY DISORDER, UNSPECIFIED TYPE: ICD-10-CM

## 2025-08-28 DIAGNOSIS — Z00.00 ENCOUNTER FOR GENERAL ADULT MEDICAL EXAMINATION W/OUT ABNORMAL FINDINGS: ICD-10-CM

## 2025-08-28 DIAGNOSIS — M79.18 MYALGIA, OTHER SITE: ICD-10-CM

## 2025-08-28 DIAGNOSIS — N76.0 ACUTE VAGINITIS: ICD-10-CM

## 2025-08-28 DIAGNOSIS — B00.1 HERPESVIRAL VESICULAR DERMATITIS: ICD-10-CM

## 2025-08-28 DIAGNOSIS — B96.89 ACUTE VAGINITIS: ICD-10-CM

## 2025-08-28 PROCEDURE — 36415 COLL VENOUS BLD VENIPUNCTURE: CPT

## 2025-08-28 PROCEDURE — 93000 ELECTROCARDIOGRAM COMPLETE: CPT

## 2025-08-28 PROCEDURE — 99214 OFFICE O/P EST MOD 30 MIN: CPT | Mod: 25

## 2025-08-28 PROCEDURE — 99396 PREV VISIT EST AGE 40-64: CPT

## 2025-08-28 RX ORDER — BUPROPION HYDROCHLORIDE 150 MG/1
150 TABLET, EXTENDED RELEASE ORAL
Qty: 90 | Refills: 0 | Status: ACTIVE | COMMUNITY
Start: 2025-08-28 | End: 1900-01-01

## 2025-08-29 LAB
ALBUMIN SERPL ELPH-MCNC: 4.7 G/DL
ALP BLD-CCNC: 94 U/L
ALT SERPL-CCNC: 13 U/L
ANION GAP SERPL CALC-SCNC: 14 MMOL/L
AST SERPL-CCNC: 22 U/L
BILIRUB SERPL-MCNC: 1 MG/DL
BUN SERPL-MCNC: 10 MG/DL
CALCIUM SERPL-MCNC: 9.9 MG/DL
CHLORIDE SERPL-SCNC: 106 MMOL/L
CHOLEST SERPL-MCNC: 191 MG/DL
CO2 SERPL-SCNC: 23 MMOL/L
CREAT SERPL-MCNC: 0.74 MG/DL
EGFRCR SERPLBLD CKD-EPI 2021: 98 ML/MIN/1.73M2
ESTIMATED AVERAGE GLUCOSE: 97 MG/DL
GLUCOSE SERPL-MCNC: 90 MG/DL
HBA1C MFR BLD HPLC: 5 %
HCT VFR BLD CALC: 40 %
HDLC SERPL-MCNC: 85 MG/DL
HGB BLD-MCNC: 13.2 G/DL
LDLC SERPL-MCNC: 96 MG/DL
MCHC RBC-ENTMCNC: 29.7 PG
MCHC RBC-ENTMCNC: 33 G/DL
MCV RBC AUTO: 90.1 FL
NONHDLC SERPL-MCNC: 106 MG/DL
PLATELET # BLD AUTO: 197 K/UL
POTASSIUM SERPL-SCNC: 4.5 MMOL/L
PROT SERPL-MCNC: 7.4 G/DL
RBC # BLD: 4.44 M/UL
RBC # FLD: 13 %
SODIUM SERPL-SCNC: 143 MMOL/L
TRIGL SERPL-MCNC: 53 MG/DL
WBC # FLD AUTO: 3.98 K/UL